# Patient Record
Sex: FEMALE | Race: WHITE
[De-identification: names, ages, dates, MRNs, and addresses within clinical notes are randomized per-mention and may not be internally consistent; named-entity substitution may affect disease eponyms.]

---

## 2019-12-13 ENCOUNTER — HOSPITAL ENCOUNTER (OUTPATIENT)
Dept: HOSPITAL 35 - CV | Age: 74
End: 2019-12-13
Attending: INTERNAL MEDICINE
Payer: COMMERCIAL

## 2019-12-13 DIAGNOSIS — E11.9: ICD-10-CM

## 2019-12-13 DIAGNOSIS — Z79.899: ICD-10-CM

## 2019-12-13 DIAGNOSIS — I08.1: Primary | ICD-10-CM

## 2019-12-13 DIAGNOSIS — I10: ICD-10-CM

## 2019-12-13 DIAGNOSIS — Z79.4: ICD-10-CM

## 2019-12-13 DIAGNOSIS — E78.5: ICD-10-CM

## 2019-12-13 NOTE — 2DMMODE
[facility]
[address]
[place]  [address]
Phone:  [phone] 2 D/M-MODE ECHOCARDIOGRAM     
_______________________________________________________________________________
 
Name:            JONATHAN MELCHOR ANN                 Room #:                    REG 
ELIF#:           [id number]          Account #:     [id number]  
Admission:       19         Attend Phys:   Mikhail Stephen
Discharge:                  Date of Birth: [birth year]  
                         Report #:      [id number]
        [id number]
_______________________________________________________________________________
THIS REPORT FOR:   //name//                          
 
 
--------------- APPROVED REPORT --------------
 
 
Study performed:  2019 10:54:07
 
EXAM: Comprehensive 2D, Doppler, and color-flow 
Echocardiogram 
Patient Location: Out-Patient   
      Status:  routine
 
      BSA:         1.95
HR: 91 bpm BP:          127/88 mmHg 
Rhythm: NSR     
 
Other Information 
Study Quality: Adequate
 
Indications
Elevated calcium score. HTN, HLP.
 
2D Dimensions
RVDd:  31.62 mm  
IVSd:  10.30 (7-11mm) LVOT Diam:  18.87 (18-24mm) 
LVDd:  39.01 mm  
PWd:  9.91 (7-11mm) Ascending Ao:  29.04 (22-36mm)
LVDs:  27.15 (25-40mm) 
Aortic Root:  32.30 mm 
 
Volumes
Left Atrial Volume (Systole) 
Single Plane 4CH:  40.44 mL Single Plane 2CH:  40.90 mL
    LA ESV Index:  23.00 mL/m2
 
Aortic Valve
AoV Peak Isauro.:  1.44 m/s 
AO Peak Gr.:  8.32 mmHg  LVOT Max P.27 mmHg
    LVOT Max V:  1.03 m/s
DEVORA Vmax: 2.00 cm2  
 
Mitral Valve
    E/A Ratio:  0.9
    MV Decel. Time:  188.85 ms
MV E Max Isauro.:  1.02 m/s 
 
 
[facility]
[email or web address]
[place]  [address]
Phone:  [phone]                    2 D/M-MODE ECHOCARDIOGRAM     
_______________________________________________________________________________
 
Name:            JONATHAN MELCHOR                 Room #:                    REG CL
ELIF#:           [id number]          Account #:     [id number]  
Admission:       19         Attend Phys:   Mikhail Stephen
Discharge:                  Date of Birth: [birth year]  
                         Report #:      [id number]
        [id number]
_______________________________________________________________________________
MV A Isauro.:  1.12 m/s  
MV PHT:  54.77 ms  
IVRT:  72.66 ms   
 
Pulmonary Valve
PV Peak Isauro.:  1.21 m/s PV Peak Gr.:  5.82 mmHg
 
Pulmonary Vein
P Vein S:    0.56 m/s 
P Vein D:   0.46 m/s 
P Vein S/D Ratio:  1.22 
 
Tricuspid Valve
TR Peak Isauro.:  2.33 m/s  RAP Estimate:  5.00 mmHg
TR Peak Gr.:  22.00 mmHg 
    PA Pressure:  27.00 mmHg
 
Left Ventricle
The left ventricle is normal size. There is normal LV segmental wall 
motion. Mild basal septal hypertrophy is present. Left ventricular 
systolic function is normal. LVEF is 60-65%. Mild diastolic 
dysfunction is present (impaired relaxation pattern).
 
Right Ventricle
The right ventricle is normal size. The right ventricular systolic 
function is normal.
 
Atria
The left atrium size is normal. The right atrium size is 
normal.
 
Aortic Valve
The aortic valve is normal in structure. Leaflets are mildly 
thickened. Trace aortic regurgitation. There is no aortic valvular 
stenosis.
 
Mitral Valve
The mitral valve is normal in structure. Mild mitral 
regurgitation.
 
Tricuspid Valve
The tricuspid valve is normal in structure. Mild tricuspid 
regurgitation. Estimated PAP is 25-30mmHg.
 
Pulmonic Valve
The pulmonary valve is normal in structure. There is no pulmonic 
 
 
[facility]
[address] [place]  [address]
Phone:  [phone]                    2 D/M-MODE ECHOCARDIOGRAM     
_______________________________________________________________________________
 
Name:            JONATHAN MELCHOR ANN                 Room #:                    REG CL
M.R.#:           [id number]          Account #:     [id number]  
Admission:       19         Attend Phys:   Mikhail Stephen
Discharge:                  Date of Birth: [birth year]  
                         Report #:      [id number]
        [id number]
_______________________________________________________________________________
valvular regurgitation.
 
Great Vessels
The aortic root is normal in size. The ascending aorta is normal in 
size. IVC is normal in size and collapses >50% with 
inspiration.
 
Pericardium
There is no pericardial effusion.
 
<Conclusion>
The left ventricle is normal size.
LVEF is 60-65%.
The aortic valve is normal in structure. Leaflets are mildly 
thickened.
Trace aortic regurgitation.
The mitral valve is normal in structure.
Mild mitral regurgitation.
The tricuspid valve is normal in structure.
Mild tricuspid regurgitation. Estimated PAP is 25-30mmHg.
The pulmonary valve is normal in structure.
There is no pulmonic valvular regurgitation.
 
 
 
 
 
 
 
 
 
 
 
 
 
 
 
 
 
 
 
 
 
 
  <ELECTRONICALLY SIGNED>
   By: Mikhail Liu MD    
  19     1259
D: 19 1259                           _____________________________________
T: 19 1259                           Mikhail Liu MD      /INF

## 2020-01-20 ENCOUNTER — HOSPITAL ENCOUNTER (OUTPATIENT)
Dept: HOSPITAL 35 - SJCVC | Age: 75
End: 2020-01-20
Attending: INTERNAL MEDICINE
Payer: COMMERCIAL

## 2020-01-20 DIAGNOSIS — G47.30: ICD-10-CM

## 2020-01-20 DIAGNOSIS — I49.9: ICD-10-CM

## 2020-01-20 DIAGNOSIS — Z79.899: ICD-10-CM

## 2020-01-20 DIAGNOSIS — E03.9: ICD-10-CM

## 2020-01-20 DIAGNOSIS — E11.9: ICD-10-CM

## 2020-01-20 DIAGNOSIS — I25.10: ICD-10-CM

## 2020-01-20 DIAGNOSIS — K21.9: ICD-10-CM

## 2020-01-20 DIAGNOSIS — I10: Primary | ICD-10-CM

## 2020-01-20 DIAGNOSIS — Z79.82: ICD-10-CM
